# Patient Record
Sex: FEMALE | Race: WHITE | NOT HISPANIC OR LATINO | URBAN - METROPOLITAN AREA
[De-identification: names, ages, dates, MRNs, and addresses within clinical notes are randomized per-mention and may not be internally consistent; named-entity substitution may affect disease eponyms.]

---
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
## 2018-03-21 ENCOUNTER — COMMUNICATION - HEALTHEAST (OUTPATIENT)
Dept: FAMILY MEDICINE | Facility: CLINIC | Age: 18
End: 2018-03-21

## 2018-03-21 DIAGNOSIS — L70.9 ACNE: ICD-10-CM

## 2018-03-28 ENCOUNTER — COMMUNICATION - HEALTHEAST (OUTPATIENT)
Dept: FAMILY MEDICINE | Facility: CLINIC | Age: 18
End: 2018-03-28

## 2018-03-28 DIAGNOSIS — Z00.00 HEALTH CARE MAINTENANCE: ICD-10-CM

## 2018-03-28 DIAGNOSIS — L70.9 ACNE: ICD-10-CM

## 2018-05-17 ENCOUNTER — OFFICE VISIT - HEALTHEAST (OUTPATIENT)
Dept: FAMILY MEDICINE | Facility: CLINIC | Age: 18
End: 2018-05-17

## 2018-05-17 ENCOUNTER — COMMUNICATION - HEALTHEAST (OUTPATIENT)
Dept: FAMILY MEDICINE | Facility: CLINIC | Age: 18
End: 2018-05-17

## 2018-05-17 DIAGNOSIS — H91.90 HARD OF HEARING: ICD-10-CM

## 2018-05-17 DIAGNOSIS — Z79.899 MEDICATION MANAGEMENT: ICD-10-CM

## 2018-05-17 DIAGNOSIS — L70.9 ACNE: ICD-10-CM

## 2018-05-17 ASSESSMENT — MIFFLIN-ST. JEOR: SCORE: 1493.43

## 2018-06-14 ENCOUNTER — COMMUNICATION - HEALTHEAST (OUTPATIENT)
Dept: OTOLARYNGOLOGY | Facility: CLINIC | Age: 18
End: 2018-06-14

## 2018-07-19 ENCOUNTER — COMMUNICATION - HEALTHEAST (OUTPATIENT)
Dept: TELEHEALTH | Facility: CLINIC | Age: 18
End: 2018-07-19

## 2018-07-19 ENCOUNTER — OFFICE VISIT - HEALTHEAST (OUTPATIENT)
Dept: FAMILY MEDICINE | Facility: CLINIC | Age: 18
End: 2018-07-19

## 2018-07-19 DIAGNOSIS — R52 BODY ACHES: ICD-10-CM

## 2018-07-19 DIAGNOSIS — R50.9 FEVER: ICD-10-CM
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
## 2018-07-20 LAB — B BURGDOR IGG+IGM SER QL: 0.03 INDEX VALUE

## 2018-07-22 ENCOUNTER — COMMUNICATION - HEALTHEAST (OUTPATIENT)
Dept: SCHEDULING | Facility: CLINIC | Age: 18
End: 2018-07-22

## 2018-07-22 LAB
A PHAGOCYTOPH DNA BLD QL NAA+PROBE: NOT DETECTED
B MICROTI IGG TITR SER: NORMAL {TITER}
E CHAFFEENSIS DNA BLD QL NAA+PROBE: NOT DETECTED
E EWINGII DNA SPEC QL NAA+PROBE: NOT DETECTED
EHRLICHIA DNA SPEC QL NAA+PROBE: NOT DETECTED
WNV IGG SER IA-ACNC: 0.61 IV
WNV IGM SER IA-ACNC: 0.03 IV

## 2018-07-23 ENCOUNTER — AMBULATORY - HEALTHEAST (OUTPATIENT)
Dept: FAMILY MEDICINE | Facility: CLINIC | Age: 18
End: 2018-07-23

## 2018-07-23 ENCOUNTER — COMMUNICATION - HEALTHEAST (OUTPATIENT)
Dept: FAMILY MEDICINE | Facility: CLINIC | Age: 18
End: 2018-07-23

## 2018-07-23 DIAGNOSIS — R50.9 FEVER: ICD-10-CM

## 2018-07-24 ENCOUNTER — COMMUNICATION - HEALTHEAST (OUTPATIENT)
Dept: FAMILY MEDICINE | Facility: CLINIC | Age: 18
End: 2018-07-24

## 2018-07-24 ENCOUNTER — COMMUNICATION - HEALTHEAST (OUTPATIENT)
Dept: SCHEDULING | Facility: CLINIC | Age: 18
End: 2018-07-24

## 2018-07-25 ENCOUNTER — COMMUNICATION - HEALTHEAST (OUTPATIENT)
Dept: FAMILY MEDICINE | Facility: CLINIC | Age: 18
End: 2018-07-25

## 2018-07-26 ENCOUNTER — COMMUNICATION - HEALTHEAST (OUTPATIENT)
Dept: SCHEDULING | Facility: CLINIC | Age: 18
End: 2018-07-26

## 2018-07-28 ENCOUNTER — COMMUNICATION - HEALTHEAST (OUTPATIENT)
Dept: FAMILY MEDICINE | Facility: CLINIC | Age: 18
End: 2018-07-28

## 2019-03-21 ENCOUNTER — COMMUNICATION - HEALTHEAST (OUTPATIENT)
Dept: FAMILY MEDICINE | Facility: CLINIC | Age: 19
End: 2019-03-21

## 2019-04-26 ENCOUNTER — AMBULATORY - HEALTHEAST (OUTPATIENT)
Dept: FAMILY MEDICINE | Facility: CLINIC | Age: 19
End: 2019-04-26

## 2019-04-26 DIAGNOSIS — L70.9 ACNE: ICD-10-CM

## 2019-06-06 ENCOUNTER — COMMUNICATION - HEALTHEAST (OUTPATIENT)
Dept: FAMILY MEDICINE | Facility: CLINIC | Age: 19
End: 2019-06-06

## 2019-07-10 ENCOUNTER — COMMUNICATION - HEALTHEAST (OUTPATIENT)
Dept: FAMILY MEDICINE | Facility: CLINIC | Age: 19
End: 2019-07-10

## 2019-07-10 DIAGNOSIS — L70.9 ACNE: ICD-10-CM

## 2019-08-14 ENCOUNTER — OFFICE VISIT - HEALTHEAST (OUTPATIENT)
Dept: FAMILY MEDICINE | Facility: CLINIC | Age: 19
End: 2019-08-14

## 2019-08-14 DIAGNOSIS — Z00.00 ROUTINE GENERAL MEDICAL EXAMINATION AT A HEALTH CARE FACILITY: ICD-10-CM

## 2019-08-14 DIAGNOSIS — L70.9 ACNE: ICD-10-CM

## 2019-08-14 LAB — HIV 1+2 AB+HIV1 P24 AG SERPL QL IA: NEGATIVE

## 2019-08-14 ASSESSMENT — MIFFLIN-ST. JEOR: SCORE: 1470.52

## 2019-08-15 ENCOUNTER — COMMUNICATION - HEALTHEAST (OUTPATIENT)
Dept: FAMILY MEDICINE | Facility: CLINIC | Age: 19
End: 2019-08-15

## 2019-08-15 LAB
25(OH)D3 SERPL-MCNC: 34.2 NG/ML (ref 30–80)
C TRACH DNA SPEC QL PROBE+SIG AMP: NEGATIVE
N GONORRHOEA DNA SPEC QL NAA+PROBE: NEGATIVE

## 2019-09-25 ENCOUNTER — COMMUNICATION - HEALTHEAST (OUTPATIENT)
Dept: FAMILY MEDICINE | Facility: CLINIC | Age: 19
End: 2019-09-25

## 2020-03-15 ENCOUNTER — COMMUNICATION - HEALTHEAST (OUTPATIENT)
Dept: FAMILY MEDICINE | Facility: CLINIC | Age: 20
End: 2020-03-15

## 2020-06-30 ENCOUNTER — COMMUNICATION - HEALTHEAST (OUTPATIENT)
Dept: FAMILY MEDICINE | Facility: CLINIC | Age: 20
End: 2020-06-30

## 2020-07-01 ENCOUNTER — AMBULATORY - HEALTHEAST (OUTPATIENT)
Dept: FAMILY MEDICINE | Facility: CLINIC | Age: 20
End: 2020-07-01

## 2020-07-01 DIAGNOSIS — L70.9 ACNE: ICD-10-CM

## 2020-10-08 ENCOUNTER — COMMUNICATION - HEALTHEAST (OUTPATIENT)
Dept: FAMILY MEDICINE | Facility: CLINIC | Age: 20
End: 2020-10-08

## 2020-12-16 ENCOUNTER — OFFICE VISIT - HEALTHEAST (OUTPATIENT)
Dept: FAMILY MEDICINE | Facility: CLINIC | Age: 20
End: 2020-12-16

## 2020-12-16 DIAGNOSIS — Z78.9 USES BIRTH CONTROL: ICD-10-CM

## 2020-12-16 DIAGNOSIS — Z13.9 SCREENING FOR CONDITION: ICD-10-CM

## 2020-12-16 DIAGNOSIS — Z79.899 MEDICATION MANAGEMENT: ICD-10-CM

## 2020-12-16 DIAGNOSIS — Z83.438 FAMILY HISTORY OF HYPERLIPIDEMIA: ICD-10-CM

## 2020-12-16 DIAGNOSIS — L70.8 OTHER ACNE: ICD-10-CM

## 2020-12-16 DIAGNOSIS — G43.109 MIGRAINE WITH AURA AND WITHOUT STATUS MIGRAINOSUS, NOT INTRACTABLE: ICD-10-CM

## 2020-12-16 DIAGNOSIS — Z00.00 ROUTINE GENERAL MEDICAL EXAMINATION AT A HEALTH CARE FACILITY: ICD-10-CM

## 2020-12-16 DIAGNOSIS — Z81.8 FAMILY HISTORY OF ANXIETY DISORDER: ICD-10-CM

## 2020-12-16 LAB
CHOLEST SERPL-MCNC: 190 MG/DL
ERYTHROCYTE [DISTWIDTH] IN BLOOD BY AUTOMATED COUNT: 11.2 % (ref 11–14.5)
FASTING STATUS PATIENT QL REPORTED: YES
FASTING STATUS PATIENT QL REPORTED: YES
GLUCOSE BLD-MCNC: 87 MG/DL (ref 70–125)
HCT VFR BLD AUTO: 42.8 % (ref 35–47)
HDLC SERPL-MCNC: 70 MG/DL
HGB BLD-MCNC: 14.2 G/DL (ref 12–16)
LDLC SERPL CALC-MCNC: 95 MG/DL
MCH RBC QN AUTO: 30.2 PG (ref 27–34)
MCHC RBC AUTO-ENTMCNC: 33.2 G/DL (ref 32–36)
MCV RBC AUTO: 91 FL (ref 80–100)
PLATELET # BLD AUTO: 246 THOU/UL (ref 140–440)
PMV BLD AUTO: 9.5 FL (ref 7–10)
RBC # BLD AUTO: 4.72 MILL/UL (ref 3.8–5.4)
TRIGL SERPL-MCNC: 123 MG/DL
WBC: 7.3 THOU/UL (ref 4–11)

## 2020-12-16 RX ORDER — SUMATRIPTAN 50 MG/1
50 TABLET, FILM COATED ORAL
Qty: 30 TABLET | Refills: 2 | Status: SHIPPED | OUTPATIENT
Start: 2020-12-16

## 2020-12-16 RX ORDER — DESOGESTREL AND ETHINYL ESTRADIOL 21-5 (28)
1 KIT ORAL DAILY
Qty: 84 TABLET | Refills: 4 | Status: SHIPPED | OUTPATIENT
Start: 2020-12-16

## 2020-12-16 ASSESSMENT — MIFFLIN-ST. JEOR: SCORE: 1549.9

## 2020-12-17 LAB
25(OH)D3 SERPL-MCNC: 23.8 NG/ML (ref 30–80)
LIPOPROTEIN (A) - HISTORICAL: <6 MG/DL

## 2020-12-18 ENCOUNTER — COMMUNICATION - HEALTHEAST (OUTPATIENT)
Dept: FAMILY MEDICINE | Facility: CLINIC | Age: 20
End: 2020-12-18

## 2020-12-18 LAB

## 2021-05-24 ENCOUNTER — RECORDS - HEALTHEAST (OUTPATIENT)
Dept: FAMILY MEDICINE | Facility: CLINIC | Age: 21
End: 2021-05-24

## 2021-05-24 ENCOUNTER — AMBULATORY - HEALTHEAST (OUTPATIENT)
Dept: FAMILY MEDICINE | Facility: CLINIC | Age: 21
End: 2021-05-24

## 2021-05-24 DIAGNOSIS — L70.9 ACNE: ICD-10-CM

## 2021-05-24 RX ORDER — ETHYNODIOL DIACETATE AND ETHINYL ESTRADIOL 1 MG-35MCG
1 KIT ORAL DAILY
Qty: 84 TABLET | Refills: 4 | Status: SHIPPED | OUTPATIENT
Start: 2021-05-24

## 2021-05-28 ENCOUNTER — OFFICE VISIT - HEALTHEAST (OUTPATIENT)
Dept: FAMILY MEDICINE | Facility: CLINIC | Age: 21
End: 2021-05-28

## 2021-05-28 DIAGNOSIS — R15.9 INCONTINENCE OF FECES, UNSPECIFIED FECAL INCONTINENCE TYPE: ICD-10-CM

## 2021-05-30 VITALS — BODY MASS INDEX: 23.58 KG/M2 | WEIGHT: 143.9 LBS

## 2021-05-31 VITALS — WEIGHT: 144.1 LBS | HEIGHT: 66 IN | BODY MASS INDEX: 23.16 KG/M2

## 2021-05-31 NOTE — PROGRESS NOTES
ASSESSMENT:  1. Routine general medical examination at a health care facility     - Chlamydia trachomatis & Neisseria gonorrhoeae, Amplified Detection  - HIV Antigen/Antibody Screening Cascade  - Vitamin D, Total (25-Hydroxy)    2. Acne     - ethynodiol-ethinyl estradiol (KELNOR 1/35, 28,) 1-35 mg-mcg per tablet; Take 1 tablet by mouth daily.  Dispense: 84 tablet; Refill: 1          PLAN:  There are no Patient Instructions on file for this visit.    Orders Placed This Encounter   Procedures     Chlamydia trachomatis & Neisseria gonorrhoeae, Amplified Detection     Order Specific Question:   Specimen Source?     Answer:   Urine     HIV Antigen/Antibody Screening Cascade     Vitamin D, Total (25-Hydroxy)     Medications Discontinued During This Encounter   Medication Reason     cefuroxime (CEFTIN) 500 MG tablet Therapy completed     DM/acetaminophen/doxylamine (VICKS NYQUIL COLD/FLU LIQUICAP ORAL)      ibuprofen (ADVIL,MOTRIN) 200 MG tablet      ethynodiol-ethinyl estradiol (KELNOR 1/35, 28,) 1-35 mg-mcg per tablet Reorder       Return in about 1 year (around 8/14/2020) for Annual physical.    Health Maintenance Due   Topic Date Due     HIV SCREENING  09/18/2013     CHLAMYDIA SCREENING  09/18/2013     INFLUENZA VACCINE RULE BASED (1) 08/01/2019       CHIEF COMPLAINT:  Chief Complaint   Patient presents with     Annual Exam     No concerns       HISTORY OF PRESENT ILLNESS:  Charisse Velásquez is a 18 y.o. female presenting to the clinic today for a physical exam.     She is doing very well at [employer].  She spent a semester abroad in [place].  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  She is looking now to do a PhD track so that she can either do research or be a professor or work on public policy.  She is no longer thinking that she wants to be an .    She is  sexually active and stable monogamous for 2 years.    We decided not to do the Pap smear this year we will do it next year and she will come in fasting so we can also check lipids because there is a family history.    Healthy Habits:   Patient reports regular exercise, dental and eye exams. Uses healthy diet. Always uses seatbelts. Reports uses medications as directed.  Alcohol: Once weekly  Smoking: Never  Caffeine use: None  Drug use: None  Birth control: Oral    REVIEW OF SYSTEMS:   All other systems are negative.    Immunization History   Administered Date(s) Administered     DTaP, historic 1998, 02/02/1999, 04/06/1999, 03/24/2000, 08/04/2004     Dtap 1998, 02/02/1999, 04/06/1999, 03/24/2000, 08/04/2004     HPV 9 Valent 06/22/2016, 09/19/2016, 07/14/2017     Hep A, Adult IM (19yr & older) 01/18/2013     Hep A, historic 01/18/2013     Hep B, historic 1998, 1998, 09/14/1999, 07/30/2013     Hepatitis A, Ped/Adol 2 Dose IM (18yr & under) 06/22/2016     IPV 1998, 02/02/1999, 04/06/1999, 08/04/2004     Influenza, inj, historic,unspecified 12/19/2018     Influenza,seasonal quad, PF, 36+MOS 10/23/2017     MMR 01/05/2000, 08/04/2004     Meningococcal MCV4P 07/15/2013, 06/22/2016     Tdap 06/23/2011     Varicella 01/05/2000, 06/13/2008       GYNECOLOGIC HISTORY:  Last menstrual period: 8/11/19  Contraception: oral contraceptives  Last Pap: N/A Results were:   Last mammogram: N/A  Results were:     OB History    None         PFSH:     Social History     Tobacco Use   Smoking Status Never Smoker   Smokeless Tobacco Never Used     Family History   Problem Relation Age of Onset     Hyperlipidemia Father      Social History     Socioeconomic History     Marital status: Single     Spouse name: None     Number of children: None     Years of education: None     Highest education level: None   Occupational History     None   Social Needs     Financial resource strain: None     Food insecurity:      "Worry: None     Inability: None     Transportation needs:     Medical: None     Non-medical: None   Tobacco Use     Smoking status: Never Smoker     Smokeless tobacco: Never Used   Substance and Sexual Activity     Alcohol use: No     Drug use: No     Sexual activity: Yes     Partners: Male     Birth control/protection: Abstinence, Condom, OCP   Lifestyle     Physical activity:     Days per week: None     Minutes per session: None     Stress: None   Relationships     Social connections:     Talks on phone: None     Gets together: None     Attends Mu-ism service: None     Active member of club or organization: None     Attends meetings of clubs or organizations: None     Relationship status: None     Intimate partner violence:     Fear of current or ex partner: None     Emotionally abused: None     Physically abused: None     Forced sexual activity: None   Other Topics Concern     None   Social History Narrative     None     History reviewed. No pertinent surgical history.  Allergies   Allergen Reactions     Amoxicillin      Doxycycline Nausea And Vomiting     Sulfamethoxazole-Trimethoprim Rash     Active Ambulatory Problems     Diagnosis Date Noted     Vaccination Not Carried Out Due To Parent Refusal      Rhinitis      Intermittent asthma 12/04/2015     Resolved Ambulatory Problems     Diagnosis Date Noted     Exercise-induced Bronchospasm      Fever 07/19/2018     No Additional Past Medical History       VITALS:  Vitals:    08/14/19 0914   BP: 104/68   Patient Site: Right Arm   Patient Position: Sitting   Cuff Size: Adult Regular   Pulse: 90   SpO2: 97%   Weight: 154 lb 11.2 oz (70.2 kg)   Height: 5' 5.5\" (1.664 m)     BP Readings from Last 3 Encounters:   08/14/19 104/68   07/19/18 102/60   05/17/18 102/68     Wt Readings from Last 3 Encounters:   08/14/19 154 lb 11.2 oz (70.2 kg)   07/19/18 161 lb 12.8 oz (73.4 kg) (88 %, Z= 1.18)*   05/17/18 158 lb (71.7 kg) (86 %, Z= 1.09)*     * Growth percentiles are " based on CDC (Girls, 2-20 Years) data.     Body mass index is 25.35 kg/m .    PHYSICAL EXAM:  General Appearance: Alert, cooperative, no distress, appears stated age  Head: Normocephalic, without obvious abnormality, atraumatic  Eyes: PERRL, conjunctiva/corneas clear, EOM's intact  Ears: Normal TM's and external ear canals, both ears  Nose: Nares normal, septum midline,mucosa normal, no drainage  Throat: Lips, mucosa, and tongue normal; teeth and gums normal  Neck: Supple, symmetrical, trachea midline, no adenopathy;  thyroid: not enlarged, symmetric, no tenderness/mass/nodules;   Back: Symmetric, no curvature, ROM normal, no CVA tenderness  Lungs: Clear to auscultation bilaterally, respirations unlabored  Breasts:  Not done this year  Heart: Regular rate and rhythm, S1 and S2 normal, no murmur, rub, or gallop, Abdomen: Soft, non-tender, bowel sounds active all four quadrants,  no masses, no organomegaly  Pelvic:Not examined  Extremities: Extremities normal, atraumatic, no cyanosis or edema  Skin: Skin color, texture, turgor normal, no rashes or lesions  Lymph nodes: Cervical, supraclavicular, and axillary nodes normal  Neurologic: Normal             MEDICATIONS:  Current Outpatient Medications   Medication Sig Dispense Refill     ethynodiol-ethinyl estradiol (KELNOR 1/35, 28,) 1-35 mg-mcg per tablet Take 1 tablet by mouth daily. 84 tablet 1     FLUCELVAX QUAD [id number], PF, 60 mcg (15 mcg x 4)/0.5 mL Syrg        No current facility-administered medications for this visit.

## 2021-06-01 VITALS — WEIGHT: 158 LBS | HEIGHT: 66 IN | BODY MASS INDEX: 25.39 KG/M2

## 2021-06-01 VITALS — WEIGHT: 161.8 LBS | BODY MASS INDEX: 26.12 KG/M2

## 2021-06-03 VITALS — WEIGHT: 154.7 LBS | HEIGHT: 66 IN | BODY MASS INDEX: 24.86 KG/M2

## 2021-06-05 VITALS
BODY MASS INDEX: 27.68 KG/M2 | DIASTOLIC BLOOD PRESSURE: 70 MMHG | HEIGHT: 66 IN | SYSTOLIC BLOOD PRESSURE: 110 MMHG | HEART RATE: 76 BPM | WEIGHT: 172.2 LBS

## 2021-06-07 ENCOUNTER — RECORDS - HEALTHEAST (OUTPATIENT)
Dept: ADMINISTRATIVE | Facility: OTHER | Age: 21
End: 2021-06-07

## 2021-06-08 NOTE — PROGRESS NOTES
Chief complaint: Fever and body aches    HPI: The patient started with a low-grade fever, 99, yesterday and today was 101.4.  She's been taking Advil and that helps a lot and she otherwise feels fine.  She's had no vomiting or diarrhea even though one sister and home had the GI flu 2 weeks ago.  She is intubated and qualified for the state treatment that is supposed to start tomorrow and possibly go into Saturday.  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  She has never had strep throat.  And again no known specific exposures aside from upper respiratory viral illness at her [employer] last week    Objective:  Visit Vitals     BP 94/58 (Patient Site: Right Arm, Patient Position: Sitting, Cuff Size: Adult Regular)     Pulse 72     Temp 99  F (37.2  C) (Oral)     Wt 143 lb 14.4 oz (65.3 kg)     LMP 12/07/2016 (Approximate)     Breastfeeding No     She is in no distress her conjunctivae are clear TMs are pearly gray no facial tenderness throat is without erythema or exudate or neck is supple without lymphadenopathy and her lungs are clear to auscultation    Assessment: Probable viral illness    Plan: We discussed letting her have a little bit of a fever to fight this infection.  She desperately wants to repeat the [employer] tomorrow so I have told her she risks getting worse and taking a longer time to recover.  If she really pushes all screens today and sleeps and has chicken noodle soup and just rests all day and rest all day after the treatment, she might be okay but this is her decision.  Her mom is here with her today so we came up with the care plan going the risks and she will plan on lying well and resting today's that she can participate tomorrow.  I have warned her that if the fever is 102 or 103 tonight or tomorrow she likely should not be competing in the tournament.

## 2021-06-11 NOTE — PROGRESS NOTES
ASSESSMENT:  1. Routine general medical examination at a health care facility       2. Intermittent asthma, uncomplicated  This was more related to an allergy or reaction to the chlorinated pool when she was swimming.  She has not been swimming regularly, so she has not needed her inhaler for quite some time.  Her ACT score is 25    3. Acne  The birth control pills are helping with that.         PLAN:  There are no Patient Instructions on file for this visit.    Orders Placed This Encounter   Procedures     HPV vaccine 9 valent 3 dose IM     Order Specific Question:   Counseling provided to include answering patients questions and/or preemptively discussing the risks and benefits of all components.     Answer:   Yes     Medications Discontinued During This Encounter   Medication Reason     KELNOR 1/35, 28, 1-35 mg-mcg per tablet      ethynodiol-ethinyl estradiol (KELNOR) 1-35 mg-mcg per tablet Reorder     ethynodiol-ethinyl estradiol (KELNOR) 1-35 mg-mcg per tablet Reorder       No Follow-up on file.    Health Maintenance Due   Topic Date Due     ASTHMA ACTION PLAN  1998     ASTHMA CONTROL TEST  1998     CHLAMYDIA SCREENING  09/18/2013     ADVANCE DIRECTIVES DISCUSSED WITH PATIENT  09/18/2016     HPV VACCINES (3 of 3 - Female 3 Dose Series) 01/19/2017     ASTHMA FOLLOW-UP  06/22/2017       CHIEF COMPLAINT:  Chief Complaint   Patient presents with     Annual Exam     not fasting       HISTORY OF PRESENT ILLNESS:  Charisse Velásquez is a 16 y.o. female presenting to the clinic today for a physical exam.     She will be starting her freshman year at [employer].  Her 3 other roommates [place] from all over the [place] so she has not met them yet.  She has had orientation and registered for classes.  She has an online required sexual violence prevention computer model that she has to complete prior to starting classes.    She is not sexually active and never has been.  We talked about not needing a Pap  until she was 19.    She needed the albuterol inhaler when she was around chlorinated pools competitively swimming which she is not anymore so her ACT score is 25 and she has not needed her albuterol inhaler in quite some time.    The birth control pill is to help her control acne and that is stable and doing well.  I will print a prescription so she can take that with her to school once she finds a pharmacy close to campus    Healthy Habits:   Patient reports regular exercise, dental and eye exams. Uses healthy diet. Always uses seatbelts. Reports uses medications as directed.  Alcohol: none  Smoking: none  Caffeine use: none  Drug use: none  Birth control: OCP and abstinence    REVIEW OF SYSTEMS:   All other systems are negative.    Immunization History   Administered Date(s) Administered     DTaP, historic 1998, 02/02/1999, 04/06/1999, 03/24/2000, 08/04/2004     HPV 9 Valent 06/22/2016, 09/19/2016, 07/14/2017     Hep A, historic 01/18/2013     Hep B, historic 1998, 1998, 09/14/1999, 07/30/2013     Hepatitis A, Ped/adol 2 Dose 06/22/2016     IPV 1998, 02/02/1999, 04/06/1999, 08/04/2004     MMR 01/05/2000, 08/04/2004     Meningococcal MCV4P 07/15/2013, 06/22/2016     Tdap 06/23/2011     Varicella 01/05/2000, 06/13/2008       GYNECOLOGIC HISTORY:  Last menstrual period: 7/1/17  Contraception: abstinence and oral contraceptives  Last Pap: n/a Results were: n/a  Last mammogram: n/a  Results were: n/a    OB History     No data available          PFSH:     History   Smoking Status     Never Smoker   Smokeless Tobacco     Never Used     No family history on file.  Social History     Social History     Marital status: Single     Spouse name: N/A     Number of children: N/A     Years of education: N/A     Social History Main Topics     Smoking status: Never Smoker     Smokeless tobacco: Never Used     Alcohol use No     Drug use: No     Sexual activity: No     Other Topics Concern     None  "    Social History Narrative     No past surgical history on file.  Allergies   Allergen Reactions     Amoxicillin      Sulfamethoxazole-Trimethoprim Rash     Active Ambulatory Problems     Diagnosis Date Noted     Vaccination Not Carried Out Due To Parent Refusal      Rhinitis      Intermittent asthma 12/04/2015     Resolved Ambulatory Problems     Diagnosis Date Noted     Exercise-induced Bronchospasm      No Additional Past Medical History       VITALS:  Vitals:    07/14/17 1338   BP: 102/62   Patient Site: Right Arm   Patient Position: Sitting   Cuff Size: Adult Regular   Pulse: 64   Weight: 144 lb 1.6 oz (65.4 kg)   Height: 5' 6\" (1.676 m)     BP Readings from Last 3 Encounters:   07/14/17 102/62   01/12/17 94/58   12/28/16 108/62     Wt Readings from Last 3 Encounters:   07/14/17 144 lb 1.6 oz (65.4 kg) (77 %, Z= 0.74)*   01/12/17 143 lb 14.4 oz (65.3 kg) (78 %, Z= 0.79)*   12/28/16 148 lb 1.6 oz (67.2 kg) (82 %, Z= 0.92)*     * Growth percentiles are based on CDC 2-20 Years data.     Body mass index is 23.26 kg/(m^2).    PHYSICAL EXAM:  General Appearance: Alert, cooperative, no distress, appears stated age  Head: Normocephalic, without obvious abnormality, atraumatic  Eyes: PERRL, conjunctiva/corneas clear, EOM's intact  Ears: Normal TM's and external ear canals, both ears  Nose: Nares normal, septum midline,mucosa normal, no drainage  Throat: Lips, mucosa, and tongue normal; teeth and gums normal  Neck: Supple, symmetrical, trachea midline, no adenopathy;  thyroid: not enlarged, symmetric, no tenderness/mass/nodules; no carotid bruit or JVD  Back: Symmetric, no curvature, ROM normal, no CVA tenderness  Lungs: Clear to auscultation bilaterally, respirations unlabored  Heart: Regular rate and rhythm, S1 and S2 normal, no murmur, rub, or gallop, Abdomen: Soft, non-tender, bowel sounds active all four quadrants,  no masses, no organomegaly  Pelvic:not examined  Extremities: Extremities normal, atraumatic, no " cyanosis or edema  Skin: Skin color, texture, turgor normal, no rashes.  Some acne  Lymph nodes: Cervical, supraclavicular, and axillary nodes normal  Neurologic: Normal        MEDICATIONS:  Current Outpatient Prescriptions   Medication Sig Dispense Refill     albuterol (PROAIR HFA) 90 mcg/actuation inhaler Inhale 2 puffs every 6 (six) hours as needed for wheezing. 1 Inhaler 4     ethynodiol-ethinyl estradiol (KELNOR) 1-35 mg-mcg per tablet Take 1 tablet by mouth daily. 84 tablet 3     ibuprofen (ADVIL,MOTRIN) 200 MG tablet Take 400 mg by mouth every 6 (six) hours as needed for pain.       No current facility-administered medications for this visit.

## 2021-06-13 NOTE — PROGRESS NOTES
ASSESSMENT:  1. Routine general medical examination at a health care facility     - Lipid Cascade  - Glucose  - Vitamin D, Total (25-Hydroxy)    2. Migraine with aura and without status migrainosus, not intractable  Excedrin Migraine had been working well and now she does this fall semester at school that it was working quite as well and she had 1.  Where she had 3 days in a row of a migraine that was not responding.  She thinks that perhaps it is related to her menstrual cycle so we will change her birth control pill to include one that has estrogen during that last week of the month.  - SUMAtriptan (IMITREX) 50 MG tablet; Take 1 tablet (50 mg total) by mouth once as needed for migraine.  Dispense: 30 tablet; Refill: 2  - Vitamin D, Total (25-Hydroxy)  - HM2(CBC w/o Differential)    3. Medication management  We will try a prescription medication for her migraine and I will change her birth control pills because of the migraines    4. Family history of hyperlipidemia  Her father is on medication and she is fasting today so we will get an assessment of her baseline lipid panel  - Lipid Cascade  - Lipoprotein A    5. Family history of anxiety disorder  This is probably related to the pandemic but she does know that she has a grandfather who has a propensity for anxiety    6. Screening for condition  This is her first Pap.  C is sexually monogamous with the same partner for last 3 years and they are both each others first partner.  - Gynecologic Cytology (PAP Smear)    7. Other acne     - desog-e.estradioL/e.estradioL (KARIVA) 0.15-0.02 mgx21 /0.01 mg x 5 per tablet; Take 1 tablet by mouth daily.  Dispense: 84 tablet; Refill: 4    8. Uses birth control     - desog-e.estradioL/e.estradioL (KARIVA) 0.15-0.02 mgx21 /0.01 mg x 5 per tablet; Take 1 tablet by mouth daily.  Dispense: 84 tablet; Refill: 4           PLAN:  There are no Patient Instructions on file for this visit.    Orders Placed This Encounter   Procedures      Lipid Cascade     Order Specific Question:   Fasting is required?     Answer:   Yes     Glucose     Vitamin D, Total (25-Hydroxy)     Lipoprotein A     HM2(CBC w/o Differential)     Medications Discontinued During This Encounter   Medication Reason     FLUCELVAX QUAD 6122-0680, PF, 60 mcg (15 mcg x 4)/0.5 mL Syrg Therapy completed     ethynodiol-ethinyl estradiol (KELNOR 1/35, 28,) 1-35 mg-mcg per tablet Alternate therapy       No follow-ups on file.    Health Maintenance Due   Topic Date Due     ASTHMA ACTION PLAN  1998     HEPATITIS C SCREENING  1998     Pneumococcal Vaccine: Pediatrics (0 to 5 Years) and At-Risk Patients (6 to 64 Years) (1 of 1 - PPSV23) 09/18/2004     PAP SMEAR  09/18/2019     INFLUENZA VACCINE RULE BASED (1) 08/01/2020     Asthma Control Test  08/14/2020     CHLAMYDIA SCREENING  08/14/2020     TD 18+ HE  06/23/2021       CHIEF COMPLAINT:  Chief Complaint   Patient presents with     Annual Exam     having bad migraines       HISTORY OF PRESENT ILLNESS:  Charisse Velásquez is a 20 y.o. female presenting to the clinic today for a physical exam.     Finish the fall semester at college and finals were completed before Thanksgiving.  She will not have to go back to campus until January 28.  She is applying to graduate schools to get a PhD in political science.  If she does not get in this year, she might take a year to work or apply to law school as her backup plan.    She is finding a little bit more anxiety that is likely related to the pandemic and her mother's cancer diagnosis this year.  She knows there is a family history so there is a slight intensity for that.    She says she is having some more migraines but does not know for certain if they are more prominent during her menstrual cycle.  She thinks that perhaps they are, so I will change her birth control pill to one that includes estrogen during the fourth week of the pill pack to see if we can mitigate that.  We discussed the  link between migraine with aura and increased risk of stroke with the use of birth control pills.  She has been on the pill for several years and it has just been this year that this is been a bit of a problem so I suspect it is more situational.      Healthy Habits  Are you taking a daily aspirin? No  Do you typically exercising at least 40 min, 3-4 times per week?  Yes  Do you usually eat at least 4 servings of fruit and vegetables a day, include whole grains and fiber and avoid regularly eating high fat foods? Yes  Have you had an eye exam in the past two years? Yes  Do you see a dentist twice per year? Yes  Do you have any concerns regarding sleep? No  Do you drink caffeine? YES    Safety Screen  If you own firearms, are they secured in a locked gun cabinet or with trigger locks? The patient does not own any firearms    REVIEW OF SYSTEMS:   All other systems are negative.    Immunization History   Administered Date(s) Administered     DTaP, historic 1998, 1999, 1999, 2000, 2004     Dtap 1998, 1999, 1999, 2000, 2004     HPV 9 Valent 2016, 2016, 2017     Hep A, Adult IM (19yr & older) 2013     Hep A, historic 2013     Hep B, historic 1998, 1998, 1999, 2013     Hepatitis A, Ped/Adol 2 Dose IM (18yr & under) 2016     INFLUENZA,SEASONAL QUAD, PF, =/> 6months 10/23/2017     IPV 1998, 1999, 1999, 2004     Influenza, inj, historic,unspecified 2018     MMR 2000, 2004     Meningococcal MCV4P 07/15/2013, 2016     Tdap 2011     Varicella 2000, 2008       GYNECOLOGIC HISTORY:  Last menstrual period:20   Contraception: oral contraceptive  Last Pap: this is her first Results were: pending       OB History        0    Para   0    Term   0       0    AB   0    Living   0       SAB   0    TAB   0    Ectopic   0    Multiple   0     Live Births   0                 PFSH:     Social History     Tobacco Use   Smoking Status Never Smoker   Smokeless Tobacco Never Used     Family History   Problem Relation Age of Onset     Breast cancer Mother      Hyperlipidemia Father      Social History     Socioeconomic History     Marital status: Single     Spouse name: None     Number of children: None     Years of education: None     Highest education level: None   Occupational History     Occupation: student     Comment: [employer]   Social Needs     Financial resource strain: None     Food insecurity     Worry: None     Inability: None     Transportation needs     Medical: None     Non-medical: None   Tobacco Use     Smoking status: Never Smoker     Smokeless tobacco: Never Used   Substance and Sexual Activity     Alcohol use: No     Drug use: No     Sexual activity: Yes     Partners: Male     Birth control/protection: Abstinence, Condom, OCP   Lifestyle     Physical activity     Days per week: None     Minutes per session: None     Stress: None   Relationships     Social connections     Talks on phone: None     Gets together: None     Attends Oriental orthodox service: None     Active member of club or organization: None     Attends meetings of clubs or organizations: None     Relationship status: None     Intimate partner violence     Fear of current or ex partner: None     Emotionally abused: None     Physically abused: None     Forced sexual activity: None   Other Topics Concern     None   Social History Narrative     None     No past surgical history on file.  Allergies   Allergen Reactions     Amoxicillin      Doxycycline Nausea And Vomiting     Sulfamethoxazole-Trimethoprim Rash     Active Ambulatory Problems     Diagnosis Date Noted     Vaccination Not Carried Out Due To Parent Refusal      Rhinitis      Resolved Ambulatory Problems     Diagnosis Date Noted     Exercise-induced Bronchospasm      Intermittent asthma 12/04/2015     Fever  "07/19/2018     No Additional Past Medical History       VITALS:  Vitals:    12/16/20 1027   BP: 110/70   Patient Site: Right Arm   Patient Position: Sitting   Cuff Size: Adult Regular   Pulse: 76   Weight: 172 lb 3.2 oz (78.1 kg)   Height: 5' 5.5\" (1.664 m)     BP Readings from Last 3 Encounters:   12/16/20 110/70   08/14/19 104/68   07/19/18 102/60     Wt Readings from Last 3 Encounters:   12/16/20 172 lb 3.2 oz (78.1 kg)   08/14/19 154 lb 11.2 oz (70.2 kg)   07/19/18 161 lb 12.8 oz (73.4 kg) (88 %, Z= 1.18)*     * Growth percentiles are based on CDC (Girls, 2-20 Years) data.     Body mass index is 28.22 kg/m .    PHYSICAL EXAM:  General Appearance: Alert, cooperative, no distress, appears stated age  Head: Normocephalic, without obvious abnormality, atraumatic  Eyes: PERRL, conjunctiva/corneas clear, EOM's intact  Ears: Normal TM's and external ear canals, both ears  Nose: Nares normal, septum midline,mucosa normal, no drainage  Throat:  Masked; going to dentist today  Neck: Supple, symmetrical, trachea midline, no adenopathy;  thyroid: not enlarged, symmetric, no tenderness/mass/nodules;    Back: Symmetric, no curvature, ROM normal, no CVA tenderness  Lungs: Clear to auscultation bilaterally, respirations unlabored  Breasts: No breast masses, tenderness, asymmetry, or nipple discharge.  Heart: Regular rate and rhythm, S1 and S2 normal, no murmur, rub, or gallop, Abdomen: Soft, non-tender, bowel sounds active all four quadrants,  no masses, no organomegaly  Pelvic:Normally developed genitalia with no external lesions or eruptions. Vagina and cervix show no lesions, inflammation, discharge or tenderness. No cystocele, No rectocele. Uterus not well appreciated due to patient's anxiousness and size.  No adnexal mass or tenderness.    Extremities: Extremities normal, atraumatic, no cyanosis or edema  Skin: Skin color, texture, turgor normal, no rashes or lesions  Lymph nodes: Cervical, supraclavicular, and axillary " nodes normal  Neurologic: Normal      MEDICATIONS:  Current Outpatient Medications   Medication Sig Dispense Refill     desog-e.estradioL/e.estradioL (KARIVA) 0.15-0.02 mgx21 /0.01 mg x 5 per tablet Take 1 tablet by mouth daily. 84 tablet 4     SUMAtriptan (IMITREX) 50 MG tablet Take 1 tablet (50 mg total) by mouth once as needed for migraine. 30 tablet 2     No current facility-administered medications for this visit.

## 2021-06-18 NOTE — PROGRESS NOTES
ASSESSMENT:  1. Acne     - ethynodiol-ethinyl estradiol (KELNOR 1/35, 28,) 1-35 mg-mcg per tablet; Take 1 tablet by mouth daily.  Dispense: 84 tablet; Refill: 4    2. Medication management       3. Hard of hearing     - Ambulatory referral to Audiology         PLAN:  There are no Patient Instructions on file for this visit.    Orders Placed This Encounter   Procedures     Ambulatory referral to Audiology     Referral Priority:   Routine     Referral Type:   Audiology     Referral Reason:   Evaluation and Treatment     Requested Specialty:   Audiology     Number of Visits Requested:   1     Medications Discontinued During This Encounter   Medication Reason     KELNOR 1/35, 28, 1-35 mg-mcg per tablet Reorder           CHIEF COMPLAINT:  Chief Complaint   Patient presents with     Annual Exam     Tinnitus     left ear, hard to hear, intermittent, no pain       HISTORY OF PRESENT ILLNESS:  Charisse Velásquez is a 17 y.o. female presenting to the clinic today for a physical exam.     He is a refill on her birth control pills which she uses for her acne.  She is not sexually active.    She finished her first year at the [employer] and in the fall she will be going to [employer] for a semester abroad.  She will need to get more than 3 months worth of birth control pills to take with her on her trips we decided to try to write a letter today and insurance to get them to try to pay for about 6 months worth at a time.    She is having some intermittent ringing in her left ear and sometimes it is hard to hear and sometimes it clears.  She does not have any known injury or infection.    Healthy Habits:   Patient reports regular exercise, dental and eye exams. Uses healthy diet. Always uses seatbelts. Reports uses medications as directed.  Alcohol: none  Smoking: none  Caffeine use: occasional  Drug use: none  Birth control: OCP    REVIEW OF SYSTEMS:   All other systems are negative.    Immunization History   Administered Date(s)  "Administered     DTaP, historic 1998, 02/02/1999, 04/06/1999, 03/24/2000, 08/04/2004     HPV 9 Valent 06/22/2016, 09/19/2016, 07/14/2017     Hep A, historic 01/18/2013     Hep B, historic 1998, 1998, 09/14/1999, 07/30/2013     Hepatitis A, Ped/Adol 2 Dose IM (18yr & under) 06/22/2016     IPV 1998, 02/02/1999, 04/06/1999, 08/04/2004     Influenza,seasonal quad, PF, 36+MOS 10/23/2017     MMR 01/05/2000, 08/04/2004     Meningococcal MCV4P 07/15/2013, 06/22/2016     Tdap 06/23/2011     Varicella 01/05/2000, 06/13/2008       GYNECOLOGIC HISTORY:  Last menstrual period: 3 weeks ago  Contraception: oral contraceptives  Last Pap: n/a Results were: n/a  Last mammogram: n/a  Results were: n/a    OB History     No data available          PFSH:     History   Smoking Status     Never Smoker   Smokeless Tobacco     Never Used     History reviewed. No pertinent family history.  Social History     Social History     Marital status: Single     Spouse name: N/A     Number of children: N/A     Years of education: N/A     Social History Main Topics     Smoking status: Never Smoker     Smokeless tobacco: Never Used     Alcohol use No     Drug use: No     Sexual activity: No     Other Topics Concern     None     Social History Narrative     History reviewed. No pertinent surgical history.  Allergies   Allergen Reactions     Amoxicillin      Sulfamethoxazole-Trimethoprim Rash     Active Ambulatory Problems     Diagnosis Date Noted     Vaccination Not Carried Out Due To Parent Refusal      Rhinitis      Intermittent asthma 12/04/2015     Resolved Ambulatory Problems     Diagnosis Date Noted     Exercise-induced Bronchospasm      No Additional Past Medical History       VITALS:  Vitals:    05/17/18 1414   BP: 102/68   Patient Site: Right Arm   Patient Position: Sitting   Cuff Size: Adult Regular   Pulse: 64   Weight: 158 lb (71.7 kg)   Height: 5' 6\" (1.676 m)     BP Readings from Last 3 Encounters:   05/17/18 102/68 "   07/14/17 102/62   01/12/17 94/58     Wt Readings from Last 3 Encounters:   05/17/18 158 lb (71.7 kg) (86 %, Z= 1.09)*   07/14/17 144 lb 1.6 oz (65.4 kg) (77 %, Z= 0.74)*   01/12/17 143 lb 14.4 oz (65.3 kg) (78 %, Z= 0.79)*     * Growth percentiles are based on [facility] 2-20 Years data.     Body mass index is 25.5 kg/(m^2).    PHYSICAL EXAM:  General Appearance: Alert, cooperative, no distress, appears stated age  Head: Normocephalic, without obvious abnormality, atraumatic  Eyes: PERRL, conjunctiva/corneas clear, EOM's intact  Ears: Normal TM's and external ear canals, both ears  Nose: Nares normal, septum midline,mucosa normal, no drainage  Throat: Lips, mucosa, and tongue normal; teeth and gums normal  Neck: Supple, symmetrical, trachea midline, no adenopathy;  thyroid: not enlarged, symmetric, no tenderness/mass/nodules; no carotid bruit or JVD  Back: Symmetric, no curvature, ROM normal, no CVA tenderness  Lungs: Clear to auscultation bilaterally, respirations unlabored  Breasts: deferred  Heart: Regular rate and rhythm, S1 and S2 normal, no murmur, rub, or gallop, Abdomen: Soft, non-tender, bowel sounds active all four quadrants,  no masses, no organomegaly  Pelvic:Not examined  Extremities: Extremities normal, atraumatic, no cyanosis or edema  Skin: Skin color, texture, turgor normal, no rashes or lesions. Acne not severe  Lymph nodes: Cervical, supraclavicular, and axillary nodes normal  Neurologic: Normal        MEDICATIONS:  Current Outpatient Prescriptions   Medication Sig Dispense Refill     ethynodiol-ethinyl estradiol (KELNOR 1/35, 28,) 1-35 mg-mcg per tablet Take 1 tablet by mouth daily. 84 tablet 4     ibuprofen (ADVIL,MOTRIN) 200 MG tablet Take 400 mg by mouth every 6 (six) hours as needed for pain.       No current facility-administered medications for this visit.

## 2021-06-19 NOTE — PROGRESS NOTES
Chief complaint: Body aches and fever    HPI: The patient was at a cabin in [place] just sitting around relaxing and she started to feel ill.  She returned home 2 days ago and felt okay but yesterday morning she had a fever and body aches and her temperature was up to 1015.  She took some Tylenol this morning when she woke up she had body aches and fever to 1025.  She denies sore throat she has fatigue she is a bit of a headache and a bit of a sore neck.  She knows that she was exposed to a lot of bug bites and she does not know if there were mosquitoes or ticks or both.  The malaise is pretty profound for her because she does not typically get sick    She will be going back to college at [employer] on 18 August and leaving on 31 August to spend the fall semester in [facility].    Objective:/60 (Patient Site: Right Arm, Patient Position: Sitting, Cuff Size: Adult Regular)  Pulse 68  Wt 161 lb 12.8 oz (73.4 kg)  LMP 06/28/2018 (Approximate)  Breastfeeding? No  BMI 26.12 kg/m2  She is ill-appearing but nontoxic.  Her conjunctiva are clear TMs are pearly gray throat is clear without erythema or exudate neck is supple with a little bit of anterior cervical adenopathy lungs are clear to auscultation    Assessment: Fever with body aches and headache    Plan: This does not really sound like mono and with the exposures in [place], we decided to check for anaplasmosis, Babesia, ehrlichiosis, Lyme, and West Nile.  If the lab tests are negative, I likely will repeat them in 2 weeks to see if there is been a serial conversion and we even discussed the possibility of empirically doing 2 weeks of doxycycline if I do not have a definitive answer before she goes out of the country.  She is comfortable with this plan and will be notified of the results of her testing.

## 2021-06-19 NOTE — LETTER
Letter by Cecile Norton MD at      Author: Cecile Norton MD Service: -- Author Type: --    Filed:  Encounter Date: 9/25/2019 Status: Signed         Charisse Velásquez  [address] [address] [id number]             September 25, 2019         Dear Ms. Velásquez,    Our records indicate that you are due for a cervical cancer screen/pap screen.  Please call the clinic or use my chart and schedule an office visit to complete this.  If you have had this done somewhere other than [facility], please help us obtain a copy of your testing/results so we can update your records.  You can also just let us know when and where you had the testing completed through a phone call or through my chart.The records can be faxed to us at [phone].    Please call with questions or contact us using [employer].    Sincerely,        Electronically signed by Cecile Norton MD

## 2021-06-19 NOTE — LETTER
Letter by Cecile Norton MD at      Author: Cecile Norton MD Service: -- Author Type: --    Filed:  Encounter Date: 6/6/2019 Status: (Other)         Charisse Velásquez  [address] [address] [address]             June 6, 2019         Dear Ms. Velásquez,    We are reviewing records and it shows that you have not completed a Asthma Control Test within the past year.  These are filled out for patients that have or have had a diagnosis of asthma or or were prescribed an inhaler for another reason.  If you do not currently have asthma, we would still appreciate you filling the form out and mailing it back to us.      If you have any questions, please feel free to message us through my chart or call the clinic at [phone].      Please call with questions or contact us using [employer].    Sincerely,        Electronically signed by Cecile Norton MD

## 2021-06-19 NOTE — LETTER
Letter by Cecile Norton MD at      Author: Cecile Norton MD Service: -- Author Type: --    Filed:  Encounter Date: 9/25/2019 Status: Signed         Charisse Velásquez  [address] [address] [facility] [address]             September 25, 2019         Dear Ms. Velásquez,    Our records indicate that you are due for a cervical cancer screen/pap screen.  Please call the clinic or use my chart and schedule an office visit to complete this.  If you have had this done somewhere other than [facility], please help us obtain a copy of your testing/results so we can update your records.  You can also just let us know when and where you had the testing completed through a phone call or through my chart.The records can be faxed to us at [phone].    Please call with questions or contact us using [employer].    Sincerely,        Electronically signed by Cecile Norton MD

## 2021-06-25 NOTE — PROGRESS NOTES
Complaint: Change in bowel habits with bowel incontinence    HPI: The patient recently graduated from college and has been accepted to graduate school at [employer].  She says that for several months she has had some dull ongoing abdominal pain.  She tells me that 3 times since January she has had fecal urgency and fecal incontinence.    She tells me that she has discovered that she cannot take lactulose and she thought that a couple of times that she had the incontinence it was because she had consumed something with lactose and now she does not do that any longer.  She also did her own experiment to try to remove gluten from her diet and she found that her headaches got dramatically better if she did not consume gluten.  She does not think there is any connection between these changes in her diet and these episodes of incontinence.    She tells me that on the episode on May 9 it occurred while she was going for a walk and all of a sudden she had an urgency and had fecal incontinence and she is not able to stop it once it starts.  She also had another episode 2 days ago and it was while she was on a walk.  She tried to empty her bowels before she went on the walk, and about a half a mile into the walk she had this incontinence and she was not able to get back home.    She describes the stool as a peanut butter consistency so little bit loose but not watery.  She typically otherwise has a normal soft regular bowel movement every morning.    She also tells me that she will occasionally notice some bright red spots or clots on the stool and a little bit of bright red blood on the toilet paper.  Has not brought this to anyone's attention until she was talking to her family and her mother and her sister insisted that she be evaluated.    She has not had any vomiting and any weight loss and there are no other signs of absorption that are obvious    Objective:/64 (Patient Site: Right Arm, Patient Position: Sitting,  Cuff Size: Adult Regular)   Pulse 78   Wt 170 lb 9.6 oz (77.4 kg)   LMP 05/21/2021 (Exact Date)   BMI 27.96 kg/m    She is in no distress.  Her bowel sounds are positive in all 4 quadrants and I cannot appreciate any discrete areas of tenderness on her abdomen or any masses.    Assessment: Fecal incontinence and some bright red blood on the stool    Plan: I am concerned about some sort of malabsorption I am also a little bit concerned about sphincter function but I will start with a referral to [place] [facility] and if we do not find the answers were looking for, I will send her to colorectal surgeon and she is comfortable with this plan.

## 2021-07-02 ENCOUNTER — RECORDS - HEALTHEAST (OUTPATIENT)
Dept: ADMINISTRATIVE | Facility: OTHER | Age: 21
End: 2021-07-02

## 2021-07-06 VITALS
BODY MASS INDEX: 27.96 KG/M2 | WEIGHT: 170.6 LBS | DIASTOLIC BLOOD PRESSURE: 64 MMHG | HEART RATE: 78 BPM | SYSTOLIC BLOOD PRESSURE: 118 MMHG

## 2021-10-16 ENCOUNTER — HEALTH MAINTENANCE LETTER (OUTPATIENT)
Age: 21
End: 2021-10-16

## 2021-12-17 DIAGNOSIS — Z91.013 ALLERGY HISTORY, SEAFOOD: Primary | ICD-10-CM

## 2022-02-05 ENCOUNTER — HEALTH MAINTENANCE LETTER (OUTPATIENT)
Age: 22
End: 2022-02-05

## 2022-10-01 ENCOUNTER — HEALTH MAINTENANCE LETTER (OUTPATIENT)
Age: 22
End: 2022-10-01

## 2023-05-14 ENCOUNTER — HEALTH MAINTENANCE LETTER (OUTPATIENT)
Age: 23
End: 2023-05-14